# Patient Record
Sex: FEMALE | Race: BLACK OR AFRICAN AMERICAN | Employment: PART TIME | URBAN - METROPOLITAN AREA
[De-identification: names, ages, dates, MRNs, and addresses within clinical notes are randomized per-mention and may not be internally consistent; named-entity substitution may affect disease eponyms.]

---

## 2020-10-07 ENCOUNTER — HOSPITAL ENCOUNTER (OUTPATIENT)
Age: 60
Setting detail: SPECIMEN
Discharge: HOME | End: 2020-10-07
Payer: MEDICAID

## 2020-10-07 LAB
ABSOLUTE EOS #: 0.04 K/UL (ref 0–0.44)
ABSOLUTE IMMATURE GRANULOCYTE: <0.03 K/UL (ref 0–0.3)
ABSOLUTE LYMPH #: 2.35 K/UL (ref 1.1–3.7)
ABSOLUTE MONO #: 0.57 K/UL (ref 0.1–1.2)
ALBUMIN SERPL-MCNC: 3.7 G/DL (ref 3.5–5.2)
ALBUMIN/GLOBULIN RATIO: 1 (ref 1–2.5)
ALP BLD-CCNC: 151 U/L (ref 35–104)
ALT SERPL-CCNC: 13 U/L (ref 5–33)
ANION GAP SERPL CALCULATED.3IONS-SCNC: 12 MMOL/L (ref 9–17)
AST SERPL-CCNC: 15 U/L
BASOPHILS # BLD: 0 % (ref 0–2)
BASOPHILS ABSOLUTE: <0.03 K/UL (ref 0–0.2)
BILIRUB SERPL-MCNC: 0.32 MG/DL (ref 0.3–1.2)
BUN BLDV-MCNC: 19 MG/DL (ref 6–20)
BUN/CREAT BLD: ABNORMAL (ref 9–20)
CALCIUM SERPL-MCNC: 9.3 MG/DL (ref 8.6–10.4)
CHLORIDE BLD-SCNC: 100 MMOL/L (ref 98–107)
CHOLESTEROL/HDL RATIO: 2.5
CHOLESTEROL: 159 MG/DL
CO2: 26 MMOL/L (ref 20–31)
CREAT SERPL-MCNC: 1.15 MG/DL (ref 0.5–0.9)
DIFFERENTIAL TYPE: ABNORMAL
EOSINOPHILS RELATIVE PERCENT: 1 % (ref 1–4)
GFR AFRICAN AMERICAN: 59 ML/MIN
GFR NON-AFRICAN AMERICAN: 48 ML/MIN
GFR SERPL CREATININE-BSD FRML MDRD: ABNORMAL ML/MIN/{1.73_M2}
GFR SERPL CREATININE-BSD FRML MDRD: ABNORMAL ML/MIN/{1.73_M2}
GLUCOSE BLD-MCNC: 286 MG/DL (ref 70–99)
HCT VFR BLD CALC: 43.5 % (ref 36.3–47.1)
HDLC SERPL-MCNC: 64 MG/DL
HEMOGLOBIN: 12.7 G/DL (ref 11.9–15.1)
IMMATURE GRANULOCYTES: 0 %
LDL CHOLESTEROL: 77 MG/DL (ref 0–130)
LYMPHOCYTES # BLD: 31 % (ref 24–43)
MCH RBC QN AUTO: 22.5 PG (ref 25.2–33.5)
MCHC RBC AUTO-ENTMCNC: 29.2 G/DL (ref 28.4–34.8)
MCV RBC AUTO: 77 FL (ref 82.6–102.9)
MONOCYTES # BLD: 8 % (ref 3–12)
NRBC AUTOMATED: 0 PER 100 WBC
PDW BLD-RTO: 14 % (ref 11.8–14.4)
PLATELET # BLD: 263 K/UL (ref 138–453)
PLATELET ESTIMATE: ABNORMAL
PMV BLD AUTO: 11.7 FL (ref 8.1–13.5)
POTASSIUM SERPL-SCNC: 4.2 MMOL/L (ref 3.7–5.3)
RBC # BLD: 5.65 M/UL (ref 3.95–5.11)
RBC # BLD: ABNORMAL 10*6/UL
SEG NEUTROPHILS: 60 % (ref 36–65)
SEGMENTED NEUTROPHILS ABSOLUTE COUNT: 4.61 K/UL (ref 1.5–8.1)
SODIUM BLD-SCNC: 138 MMOL/L (ref 135–144)
THYROXINE, FREE: 1.16 NG/DL (ref 0.93–1.7)
TOTAL PROTEIN: 7.4 G/DL (ref 6.4–8.3)
TRIGL SERPL-MCNC: 92 MG/DL
TSH SERPL DL<=0.05 MIU/L-ACNC: 1.2 MIU/L (ref 0.3–5)
VITAMIN D 25-HYDROXY: 14.2 NG/ML (ref 30–100)
VLDLC SERPL CALC-MCNC: NORMAL MG/DL (ref 1–30)
WBC # BLD: 7.6 K/UL (ref 3.5–11.3)
WBC # BLD: ABNORMAL 10*3/UL

## 2020-10-20 ENCOUNTER — HOSPITAL ENCOUNTER (OUTPATIENT)
Age: 60
Setting detail: SPECIMEN
Discharge: HOME | End: 2020-10-20
Payer: MEDICAID

## 2020-10-24 LAB
HPV SOURCE: NORMAL
HPV, GENOTYPE 16: NOT DETECTED
HPV, GENOTYPE 18: NOT DETECTED
HPV, HIGH RISK OTHER: NOT DETECTED

## 2020-10-28 LAB — CYTOLOGY REPORT: NORMAL

## 2020-11-12 ENCOUNTER — HOSPITAL ENCOUNTER (OUTPATIENT)
Dept: MAMMOGRAPHY | Age: 60
Discharge: HOME | End: 2020-11-14
Payer: MEDICAID

## 2020-11-12 PROCEDURE — 77063 BREAST TOMOSYNTHESIS BI: CPT

## 2020-12-14 ENCOUNTER — HOSPITAL ENCOUNTER (OUTPATIENT)
Dept: ULTRASOUND IMAGING | Age: 60
Discharge: HOME | End: 2020-12-16
Payer: MEDICAID

## 2020-12-14 PROCEDURE — 76856 US EXAM PELVIC COMPLETE: CPT

## 2022-02-23 ENCOUNTER — TELEPHONE (OUTPATIENT)
Dept: GASTROENTEROLOGY | Age: 62
End: 2022-02-23

## 2022-02-23 NOTE — TELEPHONE ENCOUNTER
1st attempt; called patient regarding colon screen referral. No answer and mailbox full. 2nd attempt; mailed colon screen letter to patient's home address.

## 2023-01-24 ENCOUNTER — HOSPITAL ENCOUNTER (OUTPATIENT)
Age: 63
Setting detail: SPECIMEN
Discharge: HOME | End: 2023-01-24

## 2023-01-24 LAB
ABSOLUTE EOS #: 0.13 K/UL (ref 0–0.44)
ABSOLUTE IMMATURE GRANULOCYTE: 0.03 K/UL (ref 0–0.3)
ABSOLUTE LYMPH #: 2.65 K/UL (ref 1.1–3.7)
ABSOLUTE MONO #: 0.69 K/UL (ref 0.1–1.2)
ALBUMIN SERPL-MCNC: 3.3 G/DL (ref 3.5–5.2)
ALBUMIN/GLOBULIN RATIO: 0.8 (ref 1–2.5)
ALP BLD-CCNC: 115 U/L (ref 35–104)
ALT SERPL-CCNC: 11 U/L (ref 5–33)
ANION GAP SERPL CALCULATED.3IONS-SCNC: 13 MMOL/L (ref 9–17)
AST SERPL-CCNC: 17 U/L
BASOPHILS # BLD: 0 % (ref 0–2)
BASOPHILS ABSOLUTE: 0.03 K/UL (ref 0–0.2)
BILIRUB SERPL-MCNC: 0.2 MG/DL (ref 0.3–1.2)
BUN BLDV-MCNC: 30 MG/DL (ref 8–23)
CALCIUM SERPL-MCNC: 9.4 MG/DL (ref 8.6–10.4)
CHLORIDE BLD-SCNC: 103 MMOL/L (ref 98–107)
CHOLESTEROL, FASTING: 213 MG/DL
CHOLESTEROL/HDL RATIO: 3.9
CO2: 25 MMOL/L (ref 20–31)
CREAT SERPL-MCNC: 1.74 MG/DL (ref 0.5–0.9)
EOSINOPHILS RELATIVE PERCENT: 1 % (ref 1–4)
GFR SERPL CREATININE-BSD FRML MDRD: 33 ML/MIN/1.73M2
GLUCOSE BLD-MCNC: 242 MG/DL (ref 70–99)
HCT VFR BLD CALC: 37.6 % (ref 36.3–47.1)
HDLC SERPL-MCNC: 54 MG/DL
HEMOGLOBIN: 10.8 G/DL (ref 11.9–15.1)
IMMATURE GRANULOCYTES: 0 %
INSULIN REFERENCE RANGE:: NORMAL
INSULIN: 14.4 MU/L
LDL CHOLESTEROL: 135 MG/DL (ref 0–130)
LYMPHOCYTES # BLD: 28 % (ref 24–43)
MCH RBC QN AUTO: 22.5 PG (ref 25.2–33.5)
MCHC RBC AUTO-ENTMCNC: 28.7 G/DL (ref 28.4–34.8)
MCV RBC AUTO: 78.2 FL (ref 82.6–102.9)
MONOCYTES # BLD: 7 % (ref 3–12)
NRBC AUTOMATED: 0 PER 100 WBC
PDW BLD-RTO: 13.9 % (ref 11.8–14.4)
PLATELET # BLD: 322 K/UL (ref 138–453)
PMV BLD AUTO: 11.4 FL (ref 8.1–13.5)
POTASSIUM SERPL-SCNC: 4.2 MMOL/L (ref 3.7–5.3)
RBC # BLD: 4.81 M/UL (ref 3.95–5.11)
RBC # BLD: ABNORMAL 10*6/UL
SEG NEUTROPHILS: 64 % (ref 36–65)
SEGMENTED NEUTROPHILS ABSOLUTE COUNT: 5.91 K/UL (ref 1.5–8.1)
SODIUM BLD-SCNC: 141 MMOL/L (ref 135–144)
THYROXINE, FREE: 1.07 NG/DL (ref 0.93–1.7)
TOTAL PROTEIN: 7.5 G/DL (ref 6.4–8.3)
TRIGLYCERIDE, FASTING: 118 MG/DL
TSH SERPL DL<=0.05 MIU/L-ACNC: 2.84 UIU/ML (ref 0.3–5)
VITAMIN D 25-HYDROXY: 14.9 NG/ML
WBC # BLD: 9.4 K/UL (ref 3.5–11.3)

## 2023-04-28 PROBLEM — N18.32 STAGE 3B CHRONIC KIDNEY DISEASE (HCC): Status: ACTIVE | Noted: 2023-04-28

## 2023-04-28 PROBLEM — N28.9 RENAL INSUFFICIENCY: Status: ACTIVE | Noted: 2023-04-28

## 2023-06-21 ENCOUNTER — HOSPITAL ENCOUNTER (OUTPATIENT)
Dept: ULTRASOUND IMAGING | Age: 63
Discharge: HOME | End: 2023-06-23
Payer: MEDICAID

## 2023-06-21 ENCOUNTER — HOSPITAL ENCOUNTER (OUTPATIENT)
Age: 63
Discharge: HOME | End: 2023-06-21
Payer: MEDICAID

## 2023-06-21 DIAGNOSIS — N18.32 STAGE 3B CHRONIC KIDNEY DISEASE (HCC): ICD-10-CM

## 2023-06-21 LAB
25(OH)D3 SERPL-MCNC: 27.5 NG/ML
ALBUMIN SERPL-MCNC: 3.9 G/DL (ref 3.5–5.2)
ALBUMIN/GLOB SERPL: 0.9 {RATIO} (ref 1–2.5)
ALP SERPL-CCNC: 123 U/L (ref 35–104)
ALT SERPL-CCNC: 9 U/L (ref 5–33)
ANION GAP SERPL CALCULATED.3IONS-SCNC: 15 MMOL/L (ref 9–17)
AST SERPL-CCNC: 14 U/L
BASOPHILS # BLD: 0.03 K/UL (ref 0–0.2)
BASOPHILS NFR BLD: 0 % (ref 0–2)
BILIRUB SERPL-MCNC: 0.2 MG/DL (ref 0.3–1.2)
BUN SERPL-MCNC: 45 MG/DL (ref 8–23)
C3 SERPL-MCNC: 150 MG/DL (ref 90–180)
C4 SERPL-MCNC: 44 MG/DL (ref 10–40)
CA-I BLD-SCNC: 1.29 MMOL/L (ref 1.13–1.33)
CALCIUM SERPL-MCNC: 9.4 MG/DL (ref 8.6–10.4)
CHLORIDE SERPL-SCNC: 103 MMOL/L (ref 98–107)
CO2 SERPL-SCNC: 22 MMOL/L (ref 20–31)
CREAT SERPL-MCNC: 2.36 MG/DL (ref 0.5–0.9)
EOSINOPHIL # BLD: 0.09 K/UL (ref 0–0.44)
EOSINOPHILS RELATIVE PERCENT: 1 % (ref 1–4)
ERYTHROCYTE [DISTWIDTH] IN BLOOD BY AUTOMATED COUNT: 14.9 % (ref 11.8–14.4)
FREE KAPPA/LAMBDA RATIO: 1.58 (ref 0.26–1.65)
GFR SERPL CREATININE-BSD FRML MDRD: 23 ML/MIN/1.73M2
GLUCOSE SERPL-MCNC: 117 MG/DL (ref 70–99)
HAV IGM SERPL QL IA: NONREACTIVE
HBV CORE IGM SERPL QL IA: NONREACTIVE
HBV SURFACE AG SERPL QL IA: NONREACTIVE
HCT VFR BLD AUTO: 37.2 % (ref 36.3–47.1)
HCV AB SERPL QL IA: NONREACTIVE
HGB BLD-MCNC: 11.2 G/DL (ref 11.9–15.1)
IMM GRANULOCYTES # BLD AUTO: <0.03 K/UL (ref 0–0.3)
IMM GRANULOCYTES NFR BLD: 0 %
KAPPA LC FREE SER-MCNC: 6.96 MG/DL (ref 0.37–1.94)
LAMBDA LC FREE SERPL-MCNC: 4.41 MG/DL (ref 0.57–2.63)
LYMPHOCYTES # BLD: 24 % (ref 24–43)
LYMPHOCYTES NFR BLD: 1.87 K/UL (ref 1.1–3.7)
MCH RBC QN AUTO: 22.6 PG (ref 25.2–33.5)
MCHC RBC AUTO-ENTMCNC: 30.1 G/DL (ref 28.4–34.8)
MCV RBC AUTO: 75 FL (ref 82.6–102.9)
MONOCYTES NFR BLD: 0.67 K/UL (ref 0.1–1.2)
MONOCYTES NFR BLD: 9 % (ref 3–12)
NEUTROPHILS NFR BLD: 66 % (ref 36–65)
NEUTS SEG NFR BLD: 5.14 K/UL (ref 1.5–8.1)
NRBC AUTOMATED: 0 PER 100 WBC
PHOSPHATE SERPL-MCNC: 4.5 MG/DL (ref 2.6–4.5)
PLATELET # BLD AUTO: 276 K/UL (ref 138–453)
PMV BLD AUTO: 11.1 FL (ref 8.1–13.5)
POTASSIUM SERPL-SCNC: 4.5 MMOL/L (ref 3.7–5.3)
PROT SERPL-MCNC: 8.1 G/DL (ref 6.4–8.3)
PTH-INTACT SERPL-MCNC: 44.7 PG/ML (ref 14–72)
RBC # BLD AUTO: 4.96 M/UL (ref 3.95–5.11)
RBC # BLD: ABNORMAL 10*6/UL
SODIUM SERPL-SCNC: 140 MMOL/L (ref 135–144)
WBC OTHER # BLD: 7.8 K/UL (ref 3.5–11.3)

## 2023-06-21 PROCEDURE — 83970 ASSAY OF PARATHORMONE: CPT

## 2023-06-21 PROCEDURE — 80053 COMPREHEN METABOLIC PANEL: CPT

## 2023-06-21 PROCEDURE — 82330 ASSAY OF CALCIUM: CPT

## 2023-06-21 PROCEDURE — 83516 IMMUNOASSAY NONANTIBODY: CPT

## 2023-06-21 PROCEDURE — 84100 ASSAY OF PHOSPHORUS: CPT

## 2023-06-21 PROCEDURE — 86225 DNA ANTIBODY NATIVE: CPT

## 2023-06-21 PROCEDURE — 82306 VITAMIN D 25 HYDROXY: CPT

## 2023-06-21 PROCEDURE — 80074 ACUTE HEPATITIS PANEL: CPT

## 2023-06-21 PROCEDURE — 36415 COLL VENOUS BLD VENIPUNCTURE: CPT

## 2023-06-21 PROCEDURE — 86200 CCP ANTIBODY: CPT

## 2023-06-21 PROCEDURE — 85027 COMPLETE CBC AUTOMATED: CPT

## 2023-06-21 PROCEDURE — 86160 COMPLEMENT ANTIGEN: CPT

## 2023-06-21 PROCEDURE — 86038 ANTINUCLEAR ANTIBODIES: CPT

## 2023-06-21 PROCEDURE — 83521 IG LIGHT CHAINS FREE EACH: CPT

## 2023-06-21 PROCEDURE — 86334 IMMUNOFIX E-PHORESIS SERUM: CPT

## 2023-06-21 PROCEDURE — 76770 US EXAM ABDO BACK WALL COMP: CPT

## 2023-06-23 LAB
ANA SER QL IA: NEGATIVE
CCP AB SER IA-ACNC: 0.9 U/ML (ref 0–7)
DSDNA IGG SER QL IA: 0.9 IU/ML
GBM AB SER-ACNC: <2 AU/ML (ref 0–7)
INTERPRETATION SERPL IFE-IMP: NORMAL
NUCLEAR IGG SER IA-RTO: 0.2 U/ML
PATHOLOGIST: NORMAL

## 2023-06-26 ENCOUNTER — HOSPITAL ENCOUNTER (OUTPATIENT)
Age: 63
Discharge: HOME | End: 2023-06-26
Payer: MEDICAID

## 2023-06-26 DIAGNOSIS — N18.32 STAGE 3B CHRONIC KIDNEY DISEASE (HCC): ICD-10-CM

## 2023-06-26 LAB
ANION GAP SERPL CALCULATED.3IONS-SCNC: 11 MMOL/L (ref 9–17)
BILIRUB UR QL STRIP: NEGATIVE
BUN SERPL-MCNC: 37 MG/DL (ref 8–23)
CALCIUM SERPL-MCNC: 9.2 MG/DL (ref 8.6–10.4)
CASTS #/AREA URNS LPF: ABNORMAL /LPF (ref 0–8)
CHLORIDE SERPL-SCNC: 105 MMOL/L (ref 98–107)
CLARITY UR: ABNORMAL
CO2 SERPL-SCNC: 24 MMOL/L (ref 20–31)
COLOR UR: YELLOW
CREAT SERPL-MCNC: 2.27 MG/DL (ref 0.5–0.9)
CREAT UR-MCNC: 135.8 MG/DL (ref 28–217)
EOSINOPHIL,URINE: NORMAL
EPI CELLS #/AREA URNS HPF: ABNORMAL /HPF (ref 0–5)
GFR SERPL CREATININE-BSD FRML MDRD: 24 ML/MIN/1.73M2
GLUCOSE SERPL-MCNC: 158 MG/DL (ref 70–99)
GLUCOSE UR STRIP-MCNC: NEGATIVE MG/DL
HGB UR QL STRIP.AUTO: ABNORMAL
KETONES UR STRIP-MCNC: NEGATIVE MG/DL
LEUKOCYTE ESTERASE UR QL STRIP: ABNORMAL
NITRITE UR QL STRIP: NEGATIVE
PH UR STRIP: 5.5 [PH] (ref 5–8)
POTASSIUM SERPL-SCNC: 4.2 MMOL/L (ref 3.7–5.3)
PROT UR STRIP-MCNC: ABNORMAL MG/DL
RBC #/AREA URNS HPF: ABNORMAL /HPF (ref 0–4)
SODIUM SERPL-SCNC: 140 MMOL/L (ref 135–144)
SP GR UR STRIP: 1.01 (ref 1–1.03)
TOTAL PROTEIN, URINE: 199 MG/DL
UROBILINOGEN UR STRIP-ACNC: NORMAL
WBC #/AREA URNS HPF: ABNORMAL /HPF (ref 0–5)

## 2023-06-26 PROCEDURE — 82570 ASSAY OF URINE CREATININE: CPT

## 2023-06-26 PROCEDURE — 84156 ASSAY OF PROTEIN URINE: CPT

## 2023-06-26 PROCEDURE — 83516 IMMUNOASSAY NONANTIBODY: CPT

## 2023-06-26 PROCEDURE — 87205 SMEAR GRAM STAIN: CPT

## 2023-06-26 PROCEDURE — 81001 URINALYSIS AUTO W/SCOPE: CPT

## 2023-06-26 PROCEDURE — 80048 BASIC METABOLIC PNL TOTAL CA: CPT

## 2023-06-26 PROCEDURE — 36415 COLL VENOUS BLD VENIPUNCTURE: CPT

## 2023-06-29 LAB
ANCA MYELOPEROXIDASE: <0.3 AU/ML (ref 0–3.5)
ANCA PROTEINASE 3: <0.7 AU/ML (ref 0–2)
GBM AB SER-ACNC: <2 AU/ML (ref 0–7)

## 2023-07-07 ENCOUNTER — HOSPITAL ENCOUNTER (OUTPATIENT)
Age: 63
Discharge: HOME | End: 2023-07-07
Payer: MEDICAID

## 2023-07-07 DIAGNOSIS — N18.32 STAGE 3B CHRONIC KIDNEY DISEASE (HCC): ICD-10-CM

## 2023-07-07 DIAGNOSIS — R39.89 SUSPECTED UTI: ICD-10-CM

## 2023-07-07 LAB
ANION GAP SERPL CALCULATED.3IONS-SCNC: 11 MMOL/L (ref 9–17)
BUN SERPL-MCNC: 40 MG/DL (ref 8–23)
CALCIUM SERPL-MCNC: 8.8 MG/DL (ref 8.6–10.4)
CHLORIDE SERPL-SCNC: 107 MMOL/L (ref 98–107)
CO2 SERPL-SCNC: 22 MMOL/L (ref 20–31)
CREAT SERPL-MCNC: 2.21 MG/DL (ref 0.5–0.9)
GFR SERPL CREATININE-BSD FRML MDRD: 25 ML/MIN/1.73M2
GLUCOSE SERPL-MCNC: 145 MG/DL (ref 70–99)
POTASSIUM SERPL-SCNC: 4 MMOL/L (ref 3.7–5.3)
SODIUM SERPL-SCNC: 140 MMOL/L (ref 135–144)

## 2023-07-07 PROCEDURE — 87086 URINE CULTURE/COLONY COUNT: CPT

## 2023-07-07 PROCEDURE — 80048 BASIC METABOLIC PNL TOTAL CA: CPT

## 2023-07-07 PROCEDURE — 36415 COLL VENOUS BLD VENIPUNCTURE: CPT

## 2023-07-08 LAB
MICROORGANISM SPEC CULT: NORMAL
SPECIMEN DESCRIPTION: NORMAL

## 2023-08-02 ENCOUNTER — OFFICE VISIT (OUTPATIENT)
Age: 63
End: 2023-08-02

## 2023-08-02 VITALS
HEIGHT: 63 IN | BODY MASS INDEX: 28.7 KG/M2 | HEART RATE: 74 BPM | SYSTOLIC BLOOD PRESSURE: 183 MMHG | DIASTOLIC BLOOD PRESSURE: 103 MMHG | WEIGHT: 162 LBS

## 2023-08-02 DIAGNOSIS — N20.0 CALCULUS OF LEFT KIDNEY: Primary | ICD-10-CM

## 2023-08-02 DIAGNOSIS — R31.29 MICROHEMATURIA: ICD-10-CM

## 2023-08-02 DIAGNOSIS — R35.0 URINARY FREQUENCY: ICD-10-CM

## 2023-08-02 DIAGNOSIS — R35.1 NOCTURIA: ICD-10-CM

## 2023-08-02 LAB
BILIRUBIN, POC: NORMAL
BLOOD URINE, POC: NORMAL
CLARITY, POC: CLEAR
COLOR, POC: YELLOW
GLUCOSE URINE, POC: NORMAL
KETONES, POC: NORMAL
LEUKOCYTE EST, POC: NORMAL
NITRITE, POC: NORMAL
PH, POC: NORMAL
PROTEIN, POC: NORMAL
SPECIFIC GRAVITY, POC: NORMAL
UROBILINOGEN, POC: NORMAL

## 2023-08-09 ENCOUNTER — HOSPITAL ENCOUNTER (OUTPATIENT)
Dept: CT IMAGING | Age: 63
Discharge: HOME | End: 2023-08-11
Attending: SPECIALIST
Payer: MEDICAID

## 2023-08-09 DIAGNOSIS — N20.0 CALCULUS OF LEFT KIDNEY: ICD-10-CM

## 2023-08-09 DIAGNOSIS — R31.29 MICROHEMATURIA: ICD-10-CM

## 2023-08-09 PROCEDURE — 74176 CT ABD & PELVIS W/O CONTRAST: CPT

## 2023-08-30 ENCOUNTER — OFFICE VISIT (OUTPATIENT)
Age: 63
End: 2023-08-30
Payer: MEDICAID

## 2023-08-30 DIAGNOSIS — R35.0 URINARY FREQUENCY: ICD-10-CM

## 2023-08-30 DIAGNOSIS — N20.0 CALCULUS OF LEFT KIDNEY: Primary | ICD-10-CM

## 2023-08-30 DIAGNOSIS — R35.1 NOCTURIA: ICD-10-CM

## 2023-08-30 DIAGNOSIS — R31.29 MICROHEMATURIA: ICD-10-CM

## 2023-08-30 LAB
BILIRUBIN, POC: NORMAL
BLOOD URINE, POC: NORMAL
CLARITY, POC: NORMAL
COLOR, POC: YELLOW
GLUCOSE URINE, POC: NORMAL
KETONES, POC: NORMAL
LEUKOCYTE EST, POC: NORMAL
NITRITE, POC: NORMAL
PH, POC: NORMAL
PROTEIN, POC: NORMAL
SPECIFIC GRAVITY, POC: NORMAL
UROBILINOGEN, POC: NORMAL

## 2023-08-30 PROCEDURE — 81003 URINALYSIS AUTO W/O SCOPE: CPT | Performed by: SPECIALIST

## 2023-08-30 PROCEDURE — G8419 CALC BMI OUT NRM PARAM NOF/U: HCPCS | Performed by: SPECIALIST

## 2023-08-30 PROCEDURE — 3017F COLORECTAL CA SCREEN DOC REV: CPT | Performed by: SPECIALIST

## 2023-08-30 PROCEDURE — 99215 OFFICE O/P EST HI 40 MIN: CPT | Performed by: SPECIALIST

## 2023-08-30 PROCEDURE — G8427 DOCREV CUR MEDS BY ELIG CLIN: HCPCS | Performed by: SPECIALIST

## 2023-08-30 PROCEDURE — 1036F TOBACCO NON-USER: CPT | Performed by: SPECIALIST

## 2023-09-13 DIAGNOSIS — N20.0 CALCULUS OF LEFT KIDNEY: Primary | ICD-10-CM

## 2023-10-18 RX ORDER — SODIUM CHLORIDE, SODIUM LACTATE, POTASSIUM CHLORIDE, CALCIUM CHLORIDE 600; 310; 30; 20 MG/100ML; MG/100ML; MG/100ML; MG/100ML
INJECTION, SOLUTION INTRAVENOUS CONTINUOUS
OUTPATIENT
Start: 2023-10-18

## 2023-10-23 ENCOUNTER — HOSPITAL ENCOUNTER (OUTPATIENT)
Dept: PREADMISSION TESTING | Age: 63
Discharge: HOME | End: 2023-10-27
Payer: MEDICAID

## 2023-10-23 VITALS
RESPIRATION RATE: 16 BRPM | HEART RATE: 82 BPM | TEMPERATURE: 97.5 F | BODY MASS INDEX: 29.06 KG/M2 | WEIGHT: 164 LBS | OXYGEN SATURATION: 100 % | HEIGHT: 63 IN | DIASTOLIC BLOOD PRESSURE: 91 MMHG | SYSTOLIC BLOOD PRESSURE: 160 MMHG

## 2023-10-23 LAB
ANION GAP SERPL CALCULATED.3IONS-SCNC: 11 MMOL/L (ref 9–17)
BUN SERPL-MCNC: 45 MG/DL (ref 8–23)
CHLORIDE SERPL-SCNC: 103 MMOL/L (ref 98–107)
CO2 SERPL-SCNC: 25 MMOL/L (ref 20–31)
CREAT SERPL-MCNC: 2.2 MG/DL (ref 0.5–0.9)
ERYTHROCYTE [DISTWIDTH] IN BLOOD BY AUTOMATED COUNT: 15.4 % (ref 11.8–14.4)
GFR SERPL CREATININE-BSD FRML MDRD: 25 ML/MIN/1.73M2
GLUCOSE SERPL-MCNC: 110 MG/DL (ref 70–99)
HCT VFR BLD AUTO: 38 % (ref 36.3–47.1)
HGB BLD-MCNC: 11.4 G/DL (ref 11.9–15.1)
INR PPP: 0.9
MCH RBC QN AUTO: 22.8 PG (ref 25.2–33.5)
MCHC RBC AUTO-ENTMCNC: 30 G/DL (ref 28.4–34.8)
MCV RBC AUTO: 76 FL (ref 82.6–102.9)
NRBC BLD-RTO: 0 PER 100 WBC
PARTIAL THROMBOPLASTIN TIME: 31.1 SEC (ref 23–36.5)
PLATELET # BLD AUTO: 254 K/UL (ref 138–453)
PMV BLD AUTO: 10.6 FL (ref 8.1–13.5)
POTASSIUM SERPL-SCNC: 3.8 MMOL/L (ref 3.7–5.3)
PROTHROMBIN TIME: 12.2 SEC (ref 11.7–14.9)
RBC # BLD AUTO: 5 M/UL (ref 3.95–5.11)
SODIUM SERPL-SCNC: 139 MMOL/L (ref 135–144)
WBC OTHER # BLD: 6.8 K/UL (ref 3.5–11.3)

## 2023-10-23 PROCEDURE — 80051 ELECTROLYTE PANEL: CPT

## 2023-10-23 PROCEDURE — 85027 COMPLETE CBC AUTOMATED: CPT

## 2023-10-23 PROCEDURE — 82947 ASSAY GLUCOSE BLOOD QUANT: CPT

## 2023-10-23 PROCEDURE — 85730 THROMBOPLASTIN TIME PARTIAL: CPT

## 2023-10-23 PROCEDURE — 84520 ASSAY OF UREA NITROGEN: CPT

## 2023-10-23 PROCEDURE — 82565 ASSAY OF CREATININE: CPT

## 2023-10-23 PROCEDURE — 36415 COLL VENOUS BLD VENIPUNCTURE: CPT

## 2023-10-23 PROCEDURE — 85610 PROTHROMBIN TIME: CPT

## 2023-10-23 PROCEDURE — 93005 ELECTROCARDIOGRAM TRACING: CPT | Performed by: ANESTHESIOLOGY

## 2023-10-23 RX ORDER — GLIPIZIDE 10 MG/1
TABLET ORAL
COMMUNITY
Start: 2010-07-16

## 2023-10-23 RX ORDER — CIPROFLOXACIN 2 MG/ML
400 INJECTION, SOLUTION INTRAVENOUS ONCE
OUTPATIENT
Start: 2023-10-23

## 2023-10-23 RX ORDER — LOSARTAN POTASSIUM AND HYDROCHLOROTHIAZIDE 25; 100 MG/1; MG/1
1 TABLET ORAL DAILY
COMMUNITY

## 2023-10-23 RX ORDER — SODIUM CHLORIDE 9 MG/ML
INJECTION, SOLUTION INTRAVENOUS CONTINUOUS
OUTPATIENT
Start: 2023-10-23

## 2023-10-23 NOTE — PROGRESS NOTES
Anesthesia Focused Assessment    Hx of anesthesia complications:  no  Family hx of anesthesia complications:  no      Tested positive for Covid-19 in last 8 weeks: no    STOP-BANG Sleep Apnea Questionnaire    SNORE loudly (heard through closed doors)? No  TIRED, fatigued, sleepy during daytime? No  OBSERVED stopping breathing during sleep? No  High blood PRESSURE or being treated? Yes    BMI over 35? No  AGE over 48? Yes  NECK circumference over 16\"? No  GENDER (male)? No             Total 2  High risk 5-8  Intermediate risk 3-4  Low risk 0-2    ----------------------------------------------------------------------------------------------------------------------  PIPO                              No  If yes, machine? DM1                                            No  DM2                   yes    Coronary Artery Disease      No  HTN         Yes, on meds  Defib/AICD/Pacemaker               No             Renal Failure                   No  If yes, on dialysis           Active smoker? No  Drinks alcohol? No  Illicit drugs? No  Dentition?        benign      Past Medical History:   Diagnosis Date    Calculus of left kidney     Diabetes ([address])     on insulin    High cholesterol     HTN (hypertension)     Kidney stone     Under care of team     PCP- Latia LE         Patient was evaluated in PAT & anesthesia guidelines were applied. NPO guidelines, medication instructions and scheduled arrival time were reviewed with patient. Advised patient or guardian to please notify surgeon's office if patient or child becomes ill prior to surgery. Anesthesia contacted:  yes    Spoke with Dr. Karan De Guzman. Patient with history of abnormal EKG today, none on file to compare, no other cardiac testing available.  METS > 4, denies any cardiopulmonary

## 2023-10-24 LAB
EKG ATRIAL RATE: 77 BPM
EKG P AXIS: 33 DEGREES
EKG P-R INTERVAL: 216 MS
EKG Q-T INTERVAL: 392 MS
EKG QRS DURATION: 86 MS
EKG QTC CALCULATION (BAZETT): 443 MS
EKG R AXIS: -22 DEGREES
EKG T AXIS: 13 DEGREES
EKG VENTRICULAR RATE: 77 BPM

## 2023-10-24 PROCEDURE — 93010 ELECTROCARDIOGRAM REPORT: CPT | Performed by: INTERNAL MEDICINE

## 2023-11-07 ENCOUNTER — TELEPHONE (OUTPATIENT)
Age: 63
End: 2023-11-07

## 2023-11-07 NOTE — TELEPHONE ENCOUNTER
Pt called stating she showed up for her surgery this morning at [facility] but they told her it was cancelled. Writer was unable to find any info on why this was cancelled. The pt will call in office tomorrow for better understanding.

## 2024-01-03 ENCOUNTER — HOSPITAL ENCOUNTER (OUTPATIENT)
Dept: MAMMOGRAPHY | Age: 64
Discharge: HOME | End: 2024-01-05
Payer: MEDICAID

## 2024-01-03 DIAGNOSIS — Z12.31 ENCOUNTER FOR SCREENING MAMMOGRAM FOR BREAST CANCER: ICD-10-CM

## 2024-01-03 PROCEDURE — 77063 BREAST TOMOSYNTHESIS BI: CPT

## 2024-04-16 ENCOUNTER — HOSPITAL ENCOUNTER (OUTPATIENT)
Age: 64
Discharge: HOME | End: 2024-04-16
Payer: MEDICAID

## 2024-04-16 DIAGNOSIS — R60.0 BILATERAL LOWER EXTREMITY EDEMA: ICD-10-CM

## 2024-04-16 DIAGNOSIS — N18.4 CKD (CHRONIC KIDNEY DISEASE), STAGE IV (HCC): ICD-10-CM

## 2024-04-16 DIAGNOSIS — N20.0 CALCULUS OF LEFT KIDNEY: ICD-10-CM

## 2024-04-16 DIAGNOSIS — I10 ESSENTIAL HYPERTENSION: ICD-10-CM

## 2024-04-16 LAB
25(OH)D3 SERPL-MCNC: 26 NG/ML (ref 30–100)
ALBUMIN SERPL-MCNC: 3.9 G/DL (ref 3.5–5.2)
ANION GAP SERPL CALCULATED.3IONS-SCNC: 12 MMOL/L (ref 9–16)
BUN SERPL-MCNC: 44 MG/DL (ref 8–23)
CALCIUM SERPL-MCNC: 8.9 MG/DL (ref 8.6–10.4)
CHLORIDE SERPL-SCNC: 111 MMOL/L (ref 98–107)
CO2 SERPL-SCNC: 18 MMOL/L (ref 20–31)
CREAT SERPL-MCNC: 2.2 MG/DL (ref 0.5–0.9)
ERYTHROCYTE [DISTWIDTH] IN BLOOD BY AUTOMATED COUNT: 16.1 % (ref 11.8–14.4)
GFR SERPL CREATININE-BSD FRML MDRD: 24 ML/MIN/1.73M2
GLUCOSE SERPL-MCNC: 124 MG/DL (ref 74–99)
HCT VFR BLD AUTO: 34 % (ref 36.3–47.1)
HGB BLD-MCNC: 10.1 G/DL (ref 11.9–15.1)
MCH RBC QN AUTO: 22.9 PG (ref 25.2–33.5)
MCHC RBC AUTO-ENTMCNC: 29.7 G/DL (ref 28.4–34.8)
MCV RBC AUTO: 76.9 FL (ref 82.6–102.9)
NRBC BLD-RTO: 0 PER 100 WBC
PHOSPHATE SERPL-MCNC: 4.6 MG/DL (ref 2.5–4.5)
PLATELET # BLD AUTO: 235 K/UL (ref 138–453)
PMV BLD AUTO: 10.8 FL (ref 8.1–13.5)
POTASSIUM SERPL-SCNC: 4.9 MMOL/L (ref 3.7–5.3)
PTH-INTACT SERPL-MCNC: 36 PG/ML (ref 15–65)
RBC # BLD AUTO: 4.42 M/UL (ref 3.95–5.11)
SODIUM SERPL-SCNC: 141 MMOL/L (ref 136–145)
WBC OTHER # BLD: 8.6 K/UL (ref 3.5–11.3)

## 2024-04-16 PROCEDURE — 82040 ASSAY OF SERUM ALBUMIN: CPT

## 2024-04-16 PROCEDURE — 36415 COLL VENOUS BLD VENIPUNCTURE: CPT

## 2024-04-16 PROCEDURE — 82306 VITAMIN D 25 HYDROXY: CPT

## 2024-04-16 PROCEDURE — 85027 COMPLETE CBC AUTOMATED: CPT

## 2024-04-16 PROCEDURE — 83970 ASSAY OF PARATHORMONE: CPT

## 2024-04-16 PROCEDURE — 80048 BASIC METABOLIC PNL TOTAL CA: CPT

## 2024-04-16 PROCEDURE — 84100 ASSAY OF PHOSPHORUS: CPT

## 2024-04-17 ENCOUNTER — HOSPITAL ENCOUNTER (OUTPATIENT)
Age: 64
Setting detail: SPECIMEN
Discharge: HOME | End: 2024-04-17
Payer: MEDICAID

## 2024-04-17 LAB
CREAT UR-MCNC: 66.5 MG/DL (ref 28–217)
TOTAL PROTEIN, URINE: 35 MG/DL
URINE TOTAL PROTEIN CREATININE RATIO: 0.52

## 2024-04-17 PROCEDURE — 84156 ASSAY OF PROTEIN URINE: CPT

## 2024-04-17 PROCEDURE — 82570 ASSAY OF URINE CREATININE: CPT

## 2024-07-24 ENCOUNTER — HOSPITAL ENCOUNTER (OUTPATIENT)
Age: 64
Discharge: HOME | End: 2024-07-24
Payer: MEDICAID

## 2024-07-24 LAB
25(OH)D3 SERPL-MCNC: 40.1 NG/ML (ref 30–100)
ALBUMIN SERPL-MCNC: 4 G/DL (ref 3.5–5.2)
ANION GAP SERPL CALCULATED.3IONS-SCNC: 9 MMOL/L (ref 9–16)
BUN SERPL-MCNC: 37 MG/DL (ref 8–23)
CALCIUM SERPL-MCNC: 9.2 MG/DL (ref 8.6–10.4)
CHLORIDE SERPL-SCNC: 108 MMOL/L (ref 98–107)
CO2 SERPL-SCNC: 23 MMOL/L (ref 20–31)
CREAT SERPL-MCNC: 2.2 MG/DL (ref 0.5–0.9)
CREAT UR-MCNC: 69.6 MG/DL (ref 28–217)
ERYTHROCYTE [DISTWIDTH] IN BLOOD BY AUTOMATED COUNT: 15 % (ref 11.8–14.4)
GFR, ESTIMATED: 25 ML/MIN/1.73M2
GLUCOSE SERPL-MCNC: 218 MG/DL (ref 74–99)
HCT VFR BLD AUTO: 35 % (ref 36.3–47.1)
HGB BLD-MCNC: 10.3 G/DL (ref 11.9–15.1)
MCH RBC QN AUTO: 22.9 PG (ref 25.2–33.5)
MCHC RBC AUTO-ENTMCNC: 29.4 G/DL (ref 28.4–34.8)
MCV RBC AUTO: 77.8 FL (ref 82.6–102.9)
NRBC BLD-RTO: 0 PER 100 WBC
PHOSPHATE SERPL-MCNC: 3.7 MG/DL (ref 2.5–4.5)
PLATELET # BLD AUTO: 243 K/UL (ref 138–453)
PMV BLD AUTO: 11.6 FL (ref 8.1–13.5)
POTASSIUM SERPL-SCNC: 4.4 MMOL/L (ref 3.7–5.3)
PTH-INTACT SERPL-MCNC: 39 PG/ML (ref 15–65)
RBC # BLD AUTO: 4.5 M/UL (ref 3.95–5.11)
SODIUM SERPL-SCNC: 140 MMOL/L (ref 136–145)
TOTAL PROTEIN, URINE: 57 MG/DL
URINE TOTAL PROTEIN CREATININE RATIO: 0.82
WBC OTHER # BLD: 7.2 K/UL (ref 3.5–11.3)

## 2024-07-24 PROCEDURE — 83970 ASSAY OF PARATHORMONE: CPT

## 2024-07-24 PROCEDURE — 82306 VITAMIN D 25 HYDROXY: CPT

## 2024-07-24 PROCEDURE — 82040 ASSAY OF SERUM ALBUMIN: CPT

## 2024-07-24 PROCEDURE — 80048 BASIC METABOLIC PNL TOTAL CA: CPT

## 2024-07-24 PROCEDURE — 36415 COLL VENOUS BLD VENIPUNCTURE: CPT

## 2024-07-24 PROCEDURE — 84100 ASSAY OF PHOSPHORUS: CPT

## 2024-07-24 PROCEDURE — 85027 COMPLETE CBC AUTOMATED: CPT

## 2024-07-24 PROCEDURE — 84156 ASSAY OF PROTEIN URINE: CPT

## 2024-07-24 PROCEDURE — 82570 ASSAY OF URINE CREATININE: CPT

## 2024-08-12 ENCOUNTER — OFFICE VISIT (OUTPATIENT)
Age: 64
End: 2024-08-12
Payer: MEDICAID

## 2024-08-12 VITALS — WEIGHT: 154 LBS | HEIGHT: 63 IN | BODY MASS INDEX: 27.29 KG/M2

## 2024-08-12 DIAGNOSIS — R31.29 MICROHEMATURIA: ICD-10-CM

## 2024-08-12 DIAGNOSIS — N20.0 CALCULUS OF LEFT KIDNEY: Primary | ICD-10-CM

## 2024-08-12 DIAGNOSIS — R35.1 NOCTURIA: ICD-10-CM

## 2024-08-12 DIAGNOSIS — R35.0 URINARY FREQUENCY: ICD-10-CM

## 2024-08-12 PROCEDURE — 3017F COLORECTAL CA SCREEN DOC REV: CPT | Performed by: SPECIALIST

## 2024-08-12 PROCEDURE — G8419 CALC BMI OUT NRM PARAM NOF/U: HCPCS | Performed by: SPECIALIST

## 2024-08-12 PROCEDURE — G8427 DOCREV CUR MEDS BY ELIG CLIN: HCPCS | Performed by: SPECIALIST

## 2024-08-12 PROCEDURE — 1036F TOBACCO NON-USER: CPT | Performed by: SPECIALIST

## 2024-08-12 PROCEDURE — 99214 OFFICE O/P EST MOD 30 MIN: CPT | Performed by: SPECIALIST

## 2024-08-12 RX ORDER — CHLORTHALIDONE 25 MG/1
TABLET ORAL
COMMUNITY
Start: 2024-08-05

## 2024-08-12 NOTE — PROGRESS NOTES
Dejuan Cochran MD [facility] Urology Office Progress Note    Patient:  Sabrina Thurston  YOB: 1960  Date: 8/12/2024    HISTORY OF PRESENT ILLNESS:   The patient is a 63 y.o. female  No flank pain or gross hematuria to suggest recurrent kidney stones.   Patient has a known large left lower pole non-obstructing kidney stone.  She was scheduled for a Left Percutaneous Nephrolithotomy in the fall of 2023 but needed cardiac clearance preop.  She is just now completing the recommended cardiac workup.  Will order a KUB to see if there has been any interval change since her 8/12/23 CT abdomen and pelvis without contrast (stone protocol).  Patient instructed to limit fluid intake 2-3 hours prior to bedtime to minimize nocturia or nocturnal incontinence.   Lower urinary tract symptoms: frequency and nocturia, 3 times per night   Last AUA Symptom Score (QOL): 9 (3)  Today's AUA Symptom Score (QOL): 4 (2)    Summary of old records:   Left lower pole kidney stone noted on 6/21/23 renal US (2.9 cm), 8/12/23 CT abdomen pelvis: multiple left lower pole KS (largest 1.3 cm); needs a Left Percutaneous Nephrolithotomy   Microhematuria   Stage 3b CKD (Carlos); Cards (ATIYA Venegas)    Additional History: none    Procedures Today: N/A    Urinalysis today:  No results found for this visit on 08/12/24.    Last BUN and creatinine:  Lab Results   Component Value Date    BUN 37 (H) 07/24/2024     Lab Results   Component Value Date    CREATININE 2.2 (H) 07/24/2024       Last CBC:  Lab Results   Component Value Date    WBC 7.2 07/24/2024    HGB 10.3 (L) 07/24/2024    HCT 35.0 (L) 07/24/2024    MCV 77.8 (L) 07/24/2024     07/24/2024       Additional Lab/Culture results: none    Imaging Reviewed during this Office Visit: 8/12/23 CT abdomen and pelvis without contrast (stone protocol)   The left kidney demonstrates multiple calculi involving the lower pole  calices with the largest measuring 1.3 x 1.3 cm.

## 2024-08-16 ENCOUNTER — HOSPITAL ENCOUNTER (OUTPATIENT)
Age: 64
End: 2024-08-16
Payer: MEDICAID

## 2024-08-16 ENCOUNTER — HOSPITAL ENCOUNTER (OUTPATIENT)
Dept: GENERAL RADIOLOGY | Age: 64
End: 2024-08-16
Payer: MEDICAID

## 2024-08-16 DIAGNOSIS — N20.0 CALCULUS OF LEFT KIDNEY: ICD-10-CM

## 2024-08-16 PROCEDURE — 74018 RADEX ABDOMEN 1 VIEW: CPT

## 2024-08-20 DIAGNOSIS — N20.0 URIC ACID NEPHROLITHIASIS: Primary | ICD-10-CM

## 2024-08-26 RX ORDER — METOPROLOL SUCCINATE 50 MG/1
50 TABLET, EXTENDED RELEASE ORAL DAILY
Qty: 90 TABLET | OUTPATIENT
Start: 2024-08-26

## 2024-08-26 RX ORDER — CITALOPRAM HYDROBROMIDE 20 MG/1
20 TABLET ORAL DAILY
Qty: 90 TABLET | OUTPATIENT
Start: 2024-08-26

## 2024-08-26 RX ORDER — GLIPIZIDE 10 MG/1
10 TABLET, FILM COATED, EXTENDED RELEASE ORAL
Qty: 90 TABLET | OUTPATIENT
Start: 2024-08-26

## 2024-08-26 RX ORDER — ATORVASTATIN CALCIUM 40 MG/1
40 TABLET, FILM COATED ORAL DAILY
Qty: 90 TABLET | OUTPATIENT
Start: 2024-08-26

## 2024-09-05 ENCOUNTER — OFFICE VISIT (OUTPATIENT)
Age: 64
End: 2024-09-05
Payer: MEDICAID

## 2024-09-05 VITALS — WEIGHT: 151 LBS | HEIGHT: 63 IN | BODY MASS INDEX: 26.75 KG/M2

## 2024-09-05 DIAGNOSIS — N20.0 URIC ACID NEPHROLITHIASIS: ICD-10-CM

## 2024-09-05 DIAGNOSIS — N18.4 CKD (CHRONIC KIDNEY DISEASE), STAGE IV (HCC): ICD-10-CM

## 2024-09-05 DIAGNOSIS — E87.20 METABOLIC ACIDOSIS: ICD-10-CM

## 2024-09-05 DIAGNOSIS — N20.0 CALCULUS OF LEFT KIDNEY: Primary | ICD-10-CM

## 2024-09-05 DIAGNOSIS — R35.1 NOCTURIA: ICD-10-CM

## 2024-09-05 LAB
BILIRUBIN, POC: NORMAL
BLOOD URINE, POC: NORMAL
CLARITY, POC: CLEAR
COLOR, POC: YELLOW
GLUCOSE URINE, POC: NORMAL MG/DL
KETONES, POC: NORMAL
LEUKOCYTE EST, POC: NORMAL
NITRITE, POC: NORMAL
PH, POC: NORMAL
PROTEIN, POC: NORMAL MG/DL
SPECIFIC GRAVITY, POC: NORMAL
UROBILINOGEN, POC: NORMAL

## 2024-09-05 PROCEDURE — G8419 CALC BMI OUT NRM PARAM NOF/U: HCPCS | Performed by: SPECIALIST

## 2024-09-05 PROCEDURE — 81003 URINALYSIS AUTO W/O SCOPE: CPT | Performed by: SPECIALIST

## 2024-09-05 PROCEDURE — 3017F COLORECTAL CA SCREEN DOC REV: CPT | Performed by: SPECIALIST

## 2024-09-05 PROCEDURE — G8427 DOCREV CUR MEDS BY ELIG CLIN: HCPCS | Performed by: SPECIALIST

## 2024-09-05 PROCEDURE — 99214 OFFICE O/P EST MOD 30 MIN: CPT | Performed by: SPECIALIST

## 2024-09-05 PROCEDURE — 1036F TOBACCO NON-USER: CPT | Performed by: SPECIALIST

## 2024-09-05 RX ORDER — SODIUM BICARBONATE 650 MG/1
650 TABLET ORAL 2 TIMES DAILY
Qty: 60 TABLET | Refills: 11 | Status: SHIPPED | OUTPATIENT
Start: 2024-09-05 | End: 2025-09-05

## 2024-09-05 NOTE — PROGRESS NOTES
Dejuan Cochran MD [facility] Urology Office Progress Note    Patient:  Sabrina Thurston  YOB: 1960  Date: 9/5/2024    HISTORY OF PRESENT ILLNESS:   The patient is a 63 y.o. female  The patient presents with a known left kidney stone on previous CT but the Hounsfeld units are low and her 8/16/24 KUB shows the stone is poorly visualized consistent with uric acid stones.  Will increase her Sodium bicarb to 650 mg po bid to alkalinize urine and dissolve the uric acid stones.   Will check a urine pH and BMP 1 week after restarting her Na bicarb.  Will check a renal US in 6 months to see if any residual uric acid stone left.  Lower urinary tract symptoms: urgency, frequency, hesitancy, decreased urinary stream, and nocturia, 2 times per night   Last AUA Symptom Score (QOL): 4 (2)  Today's AUA Symptom Score (QOL): 7 (2)    Summary of old records:   Left lower pole kidney stone noted on 6/21/23 renal US (2.9 cm), 8/12/23 CT abdomen pelvis: multiple left lower pole KS (largest 1.3 cm) but 8/16/24 KUB was negative   Microhematuria   Stage 3b CKD (Carlos); Cards (ATIYA Venegas)    Additional History: none    Procedures Today: N/A    Urinalysis today:  Results for POC orders placed in visit on 09/05/24   POCT Urinalysis No Micro (Auto)   Result Value Ref Range    Color, UA yellow     Clarity, UA clear     Glucose, UA POC neg mg/dL    Bilirubin, UA      Ketones, UA      Spec Grav, UA      Blood, UA POC neg     pH, UA      Protein, UA POC 30 mg mg/dL    Urobilinogen, UA      Leukocytes, UA small     Nitrite, UA neg        Last BUN and creatinine:  Lab Results   Component Value Date    BUN 37 (H) 07/24/2024     Lab Results   Component Value Date    CREATININE 2.2 (H) 07/24/2024       Last CBC:  Lab Results   Component Value Date    WBC 7.2 07/24/2024    HGB 10.3 (L) 07/24/2024    HCT 35.0 (L) 07/24/2024    MCV 77.8 (L) 07/24/2024     07/24/2024       Additional Lab/Culture results:

## 2024-09-16 ENCOUNTER — HOSPITAL ENCOUNTER (OUTPATIENT)
Age: 64
Discharge: HOME | End: 2024-09-16
Payer: MEDICAID

## 2024-09-16 DIAGNOSIS — E87.20 METABOLIC ACIDOSIS: ICD-10-CM

## 2024-09-16 DIAGNOSIS — N20.0 URIC ACID NEPHROLITHIASIS: ICD-10-CM

## 2024-09-16 LAB
ANION GAP SERPL CALCULATED.3IONS-SCNC: 10 MMOL/L (ref 9–16)
BUN SERPL-MCNC: 52 MG/DL (ref 8–23)
CALCIUM SERPL-MCNC: 8.8 MG/DL (ref 8.6–10.4)
CHLORIDE SERPL-SCNC: 112 MMOL/L (ref 98–107)
CO2 SERPL-SCNC: 19 MMOL/L (ref 20–31)
CREAT SERPL-MCNC: 2.8 MG/DL (ref 0.5–0.9)
GFR, ESTIMATED: 19 ML/MIN/1.73M2
GLUCOSE SERPL-MCNC: 185 MG/DL (ref 74–99)
PH UR STRIP: 5 [PH] (ref 5–8)
POTASSIUM SERPL-SCNC: 5.1 MMOL/L (ref 3.7–5.3)
SODIUM SERPL-SCNC: 141 MMOL/L (ref 136–145)

## 2024-09-16 PROCEDURE — 36415 COLL VENOUS BLD VENIPUNCTURE: CPT

## 2024-09-16 PROCEDURE — 83986 ASSAY PH BODY FLUID NOS: CPT

## 2024-09-16 PROCEDURE — 80048 BASIC METABOLIC PNL TOTAL CA: CPT

## 2024-10-16 ENCOUNTER — OFFICE VISIT (OUTPATIENT)
Age: 64
End: 2024-10-16
Payer: MEDICAID

## 2024-10-16 VITALS — BODY MASS INDEX: 26.93 KG/M2 | WEIGHT: 152 LBS | HEIGHT: 63 IN

## 2024-10-16 DIAGNOSIS — R35.1 NOCTURIA: ICD-10-CM

## 2024-10-16 DIAGNOSIS — N20.0 CALCULUS OF LEFT KIDNEY: ICD-10-CM

## 2024-10-16 DIAGNOSIS — N20.0 URIC ACID NEPHROLITHIASIS: Primary | ICD-10-CM

## 2024-10-16 DIAGNOSIS — E87.20 METABOLIC ACIDOSIS: ICD-10-CM

## 2024-10-16 DIAGNOSIS — N18.4 CKD (CHRONIC KIDNEY DISEASE), STAGE IV (HCC): ICD-10-CM

## 2024-10-16 PROCEDURE — 1036F TOBACCO NON-USER: CPT | Performed by: SPECIALIST

## 2024-10-16 PROCEDURE — G8484 FLU IMMUNIZE NO ADMIN: HCPCS | Performed by: SPECIALIST

## 2024-10-16 PROCEDURE — G8419 CALC BMI OUT NRM PARAM NOF/U: HCPCS | Performed by: SPECIALIST

## 2024-10-16 PROCEDURE — G8427 DOCREV CUR MEDS BY ELIG CLIN: HCPCS | Performed by: SPECIALIST

## 2024-10-16 PROCEDURE — 99214 OFFICE O/P EST MOD 30 MIN: CPT | Performed by: SPECIALIST

## 2024-10-16 PROCEDURE — 81003 URINALYSIS AUTO W/O SCOPE: CPT | Performed by: SPECIALIST

## 2024-10-16 PROCEDURE — 3017F COLORECTAL CA SCREEN DOC REV: CPT | Performed by: SPECIALIST

## 2024-10-16 RX ORDER — SODIUM BICARBONATE 650 MG/1
650 TABLET ORAL 2 TIMES DAILY
Qty: 60 TABLET | Refills: 11 | Status: SHIPPED | OUTPATIENT
Start: 2024-10-16 | End: 2025-10-16

## 2024-10-16 NOTE — PROGRESS NOTES
Dejuan Cochran MD [facility] Urology Office Progress Note    Patient:  Sabrina Thurston  YOB: 1960  Date: 10/16/2024    HISTORY OF PRESENT ILLNESS:   The patient is a 63 y.o. female  No flank pain or gross hematuria to suggest recurrent kidney stones.   Patient is supposed to taking Sodium bicarb 650 mg po bid to alkalinize urine and prevent uric acid stones.   Will order a renal US to check the status of her stones prior to her next yearly OV.  Lower urinary tract symptoms: urgency and nocturia, 2 times per night   Last AUA Symptom Score (QOL): 7 (2)  Today's AUA Symptom Score (QOL): 3 (2)    Summary of old records:   Left lower pole kidney stone noted on 6/21/23 renal US (2.9 cm), 8/12/23 CT abdomen pelvis: multiple left lower pole KS (largest 1.3 cm) but 8/16/24 KUB was negative c/w uric acid stones  Microhematuria   Stage 3b CKD (Carlos); Cards (ATIYA Venegas)    Additional History: none    Procedures Today: N/A    Urinalysis today:  Results for orders placed or performed in visit on 10/16/24   POCT Urinalysis No Micro (Auto)   Result Value Ref Range    Color, UA yellow     Clarity, UA clear     Glucose, UA  mg mg/dL    Bilirubin, UA      Ketones, UA      Spec Grav, UA      Blood, UA POC neg     pH, UA      Protein, UA POC 30 mg mg/dL    Urobilinogen, UA      Leukocytes, UA trace     Nitrite, UA neg        Last BUN and creatinine:  Lab Results   Component Value Date    BUN 52 (H) 09/16/2024     Lab Results   Component Value Date    CREATININE 2.8 (H) 09/16/2024       Last CBC:  Lab Results   Component Value Date    WBC 7.2 07/24/2024    HGB 10.3 (L) 07/24/2024    HCT 35.0 (L) 07/24/2024    MCV 77.8 (L) 07/24/2024     07/24/2024       Additional Lab/Culture results:   Component  Ref Range & Units 9/16/24 1210 6/26/23 1041   pH, Urine  5.0 - 8.0 5.0 5.5   Resulting Agency [facility] [employer] [facility] [employer] [facility]              Specimen Collected:

## 2025-01-08 ENCOUNTER — HOSPITAL ENCOUNTER (OUTPATIENT)
Age: 65
Discharge: HOME | End: 2025-01-08
Payer: MEDICAID

## 2025-01-08 DIAGNOSIS — N20.0 CALCULUS OF LEFT KIDNEY: ICD-10-CM

## 2025-01-08 DIAGNOSIS — R60.0 BILATERAL LOWER EXTREMITY EDEMA: ICD-10-CM

## 2025-01-08 DIAGNOSIS — N18.4 CKD (CHRONIC KIDNEY DISEASE), STAGE IV (HCC): ICD-10-CM

## 2025-01-08 DIAGNOSIS — E55.9 HYPOVITAMINOSIS D: ICD-10-CM

## 2025-01-08 DIAGNOSIS — E87.20 METABOLIC ACIDOSIS: ICD-10-CM

## 2025-01-08 DIAGNOSIS — I10 ESSENTIAL HYPERTENSION: ICD-10-CM

## 2025-01-08 LAB
25(OH)D3 SERPL-MCNC: 26.5 NG/ML (ref 30–100)
ALBUMIN SERPL-MCNC: 4 G/DL (ref 3.5–5.2)
ANION GAP SERPL CALCULATED.3IONS-SCNC: 10 MMOL/L (ref 9–16)
BUN SERPL-MCNC: 36 MG/DL (ref 8–23)
CALCIUM SERPL-MCNC: 9.2 MG/DL (ref 8.6–10.4)
CHLORIDE SERPL-SCNC: 109 MMOL/L (ref 98–107)
CO2 SERPL-SCNC: 22 MMOL/L (ref 20–31)
CREAT SERPL-MCNC: 2.3 MG/DL (ref 0.6–0.9)
CREAT UR-MCNC: 75.5 MG/DL (ref 28–217)
ERYTHROCYTE [DISTWIDTH] IN BLOOD BY AUTOMATED COUNT: 15 % (ref 11.8–14.4)
GFR, ESTIMATED: 23 ML/MIN/1.73M2
GLUCOSE SERPL-MCNC: 121 MG/DL (ref 74–99)
HCT VFR BLD AUTO: 37.1 % (ref 36.3–47.1)
HGB BLD-MCNC: 10.7 G/DL (ref 11.9–15.1)
MCH RBC QN AUTO: 22.6 PG (ref 25.2–33.5)
MCHC RBC AUTO-ENTMCNC: 28.8 G/DL (ref 28.4–34.8)
MCV RBC AUTO: 78.4 FL (ref 82.6–102.9)
NRBC BLD-RTO: 0 PER 100 WBC
PH UR STRIP: 6 [PH] (ref 5–8)
PHOSPHATE SERPL-MCNC: 4.3 MG/DL (ref 2.5–4.5)
PLATELET # BLD AUTO: 271 K/UL (ref 138–453)
PMV BLD AUTO: 10.9 FL (ref 8.1–13.5)
POTASSIUM SERPL-SCNC: 4.7 MMOL/L (ref 3.7–5.3)
PTH-INTACT SERPL-MCNC: 57 PG/ML (ref 15–65)
RBC # BLD AUTO: 4.73 M/UL (ref 3.95–5.11)
SODIUM SERPL-SCNC: 141 MMOL/L (ref 136–145)
TOTAL PROTEIN, URINE: 92 MG/DL
URINE TOTAL PROTEIN CREATININE RATIO: 1.22 (ref 0–0.2)
WBC OTHER # BLD: 8.8 K/UL (ref 3.5–11.3)

## 2025-01-08 PROCEDURE — 36415 COLL VENOUS BLD VENIPUNCTURE: CPT

## 2025-01-08 PROCEDURE — 84156 ASSAY OF PROTEIN URINE: CPT

## 2025-01-08 PROCEDURE — 80048 BASIC METABOLIC PNL TOTAL CA: CPT

## 2025-01-08 PROCEDURE — 82570 ASSAY OF URINE CREATININE: CPT

## 2025-01-08 PROCEDURE — 84100 ASSAY OF PHOSPHORUS: CPT

## 2025-01-08 PROCEDURE — 83970 ASSAY OF PARATHORMONE: CPT

## 2025-01-08 PROCEDURE — 85027 COMPLETE CBC AUTOMATED: CPT

## 2025-01-08 PROCEDURE — 82306 VITAMIN D 25 HYDROXY: CPT

## 2025-01-08 PROCEDURE — 83986 ASSAY PH BODY FLUID NOS: CPT

## 2025-01-08 PROCEDURE — 82040 ASSAY OF SERUM ALBUMIN: CPT

## 2025-01-14 ENCOUNTER — HOSPITAL ENCOUNTER (OUTPATIENT)
Dept: MAMMOGRAPHY | Age: 65
Discharge: HOME | End: 2025-01-16
Payer: MEDICAID

## 2025-01-14 VITALS — HEIGHT: 63 IN | BODY MASS INDEX: 26.93 KG/M2 | WEIGHT: 152 LBS

## 2025-01-14 DIAGNOSIS — Z12.31 VISIT FOR SCREENING MAMMOGRAM: ICD-10-CM

## 2025-01-14 PROCEDURE — 77063 BREAST TOMOSYNTHESIS BI: CPT

## 2025-04-28 ENCOUNTER — HOSPITAL ENCOUNTER (OUTPATIENT)
Age: 65
Discharge: HOME | End: 2025-04-28
Payer: MEDICAID

## 2025-04-28 DIAGNOSIS — N18.4 CKD (CHRONIC KIDNEY DISEASE), STAGE IV (HCC): ICD-10-CM

## 2025-04-28 DIAGNOSIS — I10 ESSENTIAL HYPERTENSION: ICD-10-CM

## 2025-04-28 DIAGNOSIS — E87.20 METABOLIC ACIDOSIS: ICD-10-CM

## 2025-04-28 DIAGNOSIS — N20.0 CALCULUS OF LEFT KIDNEY: ICD-10-CM

## 2025-04-28 DIAGNOSIS — R60.0 BILATERAL LOWER EXTREMITY EDEMA: ICD-10-CM

## 2025-04-28 LAB
25(OH)D3 SERPL-MCNC: 26.6 NG/ML (ref 30–100)
ALBUMIN SERPL-MCNC: 4 G/DL (ref 3.5–5.2)
ANION GAP SERPL CALCULATED.3IONS-SCNC: 11 MMOL/L (ref 9–16)
BUN SERPL-MCNC: 55 MG/DL (ref 8–23)
CALCIUM SERPL-MCNC: 8.9 MG/DL (ref 8.6–10.4)
CHLORIDE SERPL-SCNC: 111 MMOL/L (ref 98–107)
CO2 SERPL-SCNC: 18 MMOL/L (ref 20–31)
CREAT SERPL-MCNC: 2.9 MG/DL (ref 0.6–0.9)
ERYTHROCYTE [DISTWIDTH] IN BLOOD BY AUTOMATED COUNT: 15.1 % (ref 11.8–14.4)
GFR, ESTIMATED: 18 ML/MIN/1.73M2
GLUCOSE SERPL-MCNC: 254 MG/DL (ref 74–99)
HCT VFR BLD AUTO: 32.6 % (ref 36.3–47.1)
HGB BLD-MCNC: 9.6 G/DL (ref 11.9–15.1)
MCH RBC QN AUTO: 22.8 PG (ref 25.2–33.5)
MCHC RBC AUTO-ENTMCNC: 29.4 G/DL (ref 28.4–34.8)
MCV RBC AUTO: 77.4 FL (ref 82.6–102.9)
NRBC BLD-RTO: 0 PER 100 WBC
PHOSPHATE SERPL-MCNC: 4.4 MG/DL (ref 2.5–4.5)
PLATELET # BLD AUTO: 235 K/UL (ref 138–453)
PMV BLD AUTO: 11.9 FL (ref 8.1–13.5)
POTASSIUM SERPL-SCNC: 4.9 MMOL/L (ref 3.7–5.3)
PTH-INTACT SERPL-MCNC: 67 PG/ML (ref 17.9–58.6)
RBC # BLD AUTO: 4.21 M/UL (ref 3.95–5.11)
SODIUM SERPL-SCNC: 140 MMOL/L (ref 136–145)
WBC OTHER # BLD: 8 K/UL (ref 3.5–11.3)

## 2025-04-28 PROCEDURE — 36415 COLL VENOUS BLD VENIPUNCTURE: CPT

## 2025-04-28 PROCEDURE — 84100 ASSAY OF PHOSPHORUS: CPT

## 2025-04-28 PROCEDURE — 85027 COMPLETE CBC AUTOMATED: CPT

## 2025-04-28 PROCEDURE — 80048 BASIC METABOLIC PNL TOTAL CA: CPT

## 2025-04-28 PROCEDURE — 82306 VITAMIN D 25 HYDROXY: CPT

## 2025-04-28 PROCEDURE — 82040 ASSAY OF SERUM ALBUMIN: CPT

## 2025-04-28 PROCEDURE — 83970 ASSAY OF PARATHORMONE: CPT

## 2025-04-29 ENCOUNTER — HOSPITAL ENCOUNTER (OUTPATIENT)
Age: 65
Setting detail: SPECIMEN
Discharge: HOME | End: 2025-04-29
Payer: MEDICAID

## 2025-04-29 LAB
CREAT UR-MCNC: 71.1 MG/DL (ref 28–217)
TOTAL PROTEIN, URINE: 14 MG/DL
URINE TOTAL PROTEIN CREATININE RATIO: 0.2 (ref 0–0.2)

## 2025-04-29 PROCEDURE — 82570 ASSAY OF URINE CREATININE: CPT

## 2025-04-29 PROCEDURE — 84156 ASSAY OF PROTEIN URINE: CPT

## 2025-06-02 ENCOUNTER — OFFICE VISIT (OUTPATIENT)
Age: 65
End: 2025-06-02
Payer: MEDICAID

## 2025-06-02 VITALS — BODY MASS INDEX: 28.35 KG/M2 | WEIGHT: 160 LBS | HEIGHT: 63 IN

## 2025-06-02 DIAGNOSIS — N20.0 CALCULUS OF LEFT KIDNEY: ICD-10-CM

## 2025-06-02 DIAGNOSIS — R31.29 MICROHEMATURIA: ICD-10-CM

## 2025-06-02 DIAGNOSIS — N20.0 URIC ACID NEPHROLITHIASIS: Primary | ICD-10-CM

## 2025-06-02 PROCEDURE — 1036F TOBACCO NON-USER: CPT | Performed by: SPECIALIST

## 2025-06-02 PROCEDURE — 3017F COLORECTAL CA SCREEN DOC REV: CPT | Performed by: SPECIALIST

## 2025-06-02 PROCEDURE — G8419 CALC BMI OUT NRM PARAM NOF/U: HCPCS | Performed by: SPECIALIST

## 2025-06-02 PROCEDURE — G8427 DOCREV CUR MEDS BY ELIG CLIN: HCPCS | Performed by: SPECIALIST

## 2025-06-02 PROCEDURE — 81003 URINALYSIS AUTO W/O SCOPE: CPT | Performed by: SPECIALIST

## 2025-06-02 PROCEDURE — 99214 OFFICE O/P EST MOD 30 MIN: CPT | Performed by: SPECIALIST

## 2025-06-02 NOTE — PROGRESS NOTES
Dejuan Cochran MD [facility] Urology Office Progress Note    Patient:  Sabrina Thurston  YOB: 1960  Date: 6/2/2025    HISTORY OF PRESENT ILLNESS:   The patient is a 64 y.o. female  Patient has known 1.3 cm left lower pole kidney stone noted on 8/9/23 CT abdomen and pelvis without contrast (stone protocol).  Patient's has uric acid stones which don't show up on KUB.  Will check a Renal US to check the stone volume in the left kidney.  Continue Sodium bicarb 650 mg po bid to alkalinize urine and prevent uric acid stones.   Stable microhematuria on today's UA with negative workup in the past.  Thus, no further workup required.    Lower urinary tract symptoms: nocturia, 2 times per night   Last AUA Symptom Score (QOL): 3 (2)  Today's AUA Symptom Score (QOL): 2 (2)    Summary of old records:   Left lower pole kidney stone noted on 6/21/23 renal US (2.9 cm), 8/12/23 CT abdomen pelvis: multiple left lower pole KS (largest 1.3 cm) but 8/16/24 KUB was negative c/w uric acid stones; Sodium bicarb 650 mg po bid  Microhematuria   Stage 3b CKD (Kasmani); Cards (ATIYA Venegas)    Additional History: none    Procedures Today: N/A    Urinalysis today:  Results for orders placed or performed in visit on 06/02/25   POCT Urinalysis No Micro (Auto)   Result Value Ref Range    Color, UA yellow     Clarity, UA clear     Glucose, UA POC >=1000 mg mg/dL    Bilirubin, UA      Ketones, UA      Spec Grav, UA      Blood, UA POC large     pH, UA      Protein, UA POC 30 mg mg/dL    Urobilinogen, UA      Leukocytes, UA trace     Nitrite, UA neg        Last BUN and creatinine:  Lab Results   Component Value Date    BUN 55 (H) 04/28/2025     Lab Results   Component Value Date    CREATININE 2.9 (H) 04/28/2025       Last CBC:  Lab Results   Component Value Date    WBC 8.0 04/28/2025    HGB 9.6 (L) 04/28/2025    HCT 32.6 (L) 04/28/2025    MCV 77.4 (L) 04/28/2025     04/28/2025       Additional Lab/Culture

## 2025-06-04 ENCOUNTER — HOSPITAL ENCOUNTER (OUTPATIENT)
Dept: ULTRASOUND IMAGING | Age: 65
Discharge: HOME | End: 2025-06-06
Attending: SPECIALIST
Payer: MEDICAID

## 2025-06-04 DIAGNOSIS — N20.0 URIC ACID NEPHROLITHIASIS: ICD-10-CM

## 2025-06-04 DIAGNOSIS — N20.0 CALCULUS OF LEFT KIDNEY: ICD-10-CM

## 2025-06-04 PROCEDURE — 76775 US EXAM ABDO BACK WALL LIM: CPT

## 2025-06-06 ENCOUNTER — RESULTS FOLLOW-UP (OUTPATIENT)
Age: 65
End: 2025-06-06